# Patient Record
Sex: FEMALE | Race: WHITE | NOT HISPANIC OR LATINO
[De-identification: names, ages, dates, MRNs, and addresses within clinical notes are randomized per-mention and may not be internally consistent; named-entity substitution may affect disease eponyms.]

---

## 2018-11-27 ENCOUNTER — RECORD ABSTRACTING (OUTPATIENT)
Age: 38
End: 2018-11-27

## 2018-11-27 DIAGNOSIS — Z80.0 FAMILY HISTORY OF MALIGNANT NEOPLASM OF DIGESTIVE ORGANS: ICD-10-CM

## 2018-11-27 DIAGNOSIS — Z82.49 FAMILY HISTORY OF ISCHEMIC HEART DISEASE AND OTHER DISEASES OF THE CIRCULATORY SYSTEM: ICD-10-CM

## 2018-11-27 DIAGNOSIS — Z78.9 OTHER SPECIFIED HEALTH STATUS: ICD-10-CM

## 2018-11-27 DIAGNOSIS — Z82.3 FAMILY HISTORY OF STROKE: ICD-10-CM

## 2018-11-27 DIAGNOSIS — Z82.69 FAMILY HISTORY OF OTHER DISEASES OF THE MUSCULOSKELETAL SYSTEM AND CONNECTIVE TISSUE: ICD-10-CM

## 2018-11-27 DIAGNOSIS — Z83.3 FAMILY HISTORY OF DIABETES MELLITUS: ICD-10-CM

## 2018-11-27 LAB — CYTOLOGY CVX/VAG DOC THIN PREP: NORMAL

## 2018-12-07 ENCOUNTER — APPOINTMENT (OUTPATIENT)
Dept: OBGYN | Facility: CLINIC | Age: 38
End: 2018-12-07
Payer: COMMERCIAL

## 2018-12-07 ENCOUNTER — NON-APPOINTMENT (OUTPATIENT)
Age: 38
End: 2018-12-07

## 2018-12-07 VITALS
HEIGHT: 60 IN | SYSTOLIC BLOOD PRESSURE: 100 MMHG | DIASTOLIC BLOOD PRESSURE: 65 MMHG | BODY MASS INDEX: 26.7 KG/M2 | WEIGHT: 136 LBS

## 2018-12-07 PROCEDURE — 0502F SUBSEQUENT PRENATAL CARE: CPT

## 2019-01-04 ENCOUNTER — NON-APPOINTMENT (OUTPATIENT)
Age: 39
End: 2019-01-04

## 2019-01-04 ENCOUNTER — APPOINTMENT (OUTPATIENT)
Dept: OBGYN | Facility: CLINIC | Age: 39
End: 2019-01-04
Payer: COMMERCIAL

## 2019-01-04 VITALS
BODY MASS INDEX: 28.27 KG/M2 | SYSTOLIC BLOOD PRESSURE: 100 MMHG | DIASTOLIC BLOOD PRESSURE: 60 MMHG | HEIGHT: 60 IN | WEIGHT: 144 LBS

## 2019-01-04 PROCEDURE — 0502F SUBSEQUENT PRENATAL CARE: CPT

## 2019-01-22 ENCOUNTER — CLINICAL ADVICE (OUTPATIENT)
Age: 39
End: 2019-01-22

## 2019-01-22 ENCOUNTER — NON-APPOINTMENT (OUTPATIENT)
Age: 39
End: 2019-01-22

## 2019-01-31 ENCOUNTER — APPOINTMENT (OUTPATIENT)
Dept: OBGYN | Facility: CLINIC | Age: 39
End: 2019-01-31
Payer: COMMERCIAL

## 2019-01-31 ENCOUNTER — NON-APPOINTMENT (OUTPATIENT)
Age: 39
End: 2019-01-31

## 2019-01-31 VITALS
HEIGHT: 60 IN | SYSTOLIC BLOOD PRESSURE: 110 MMHG | BODY MASS INDEX: 29.45 KG/M2 | WEIGHT: 150 LBS | DIASTOLIC BLOOD PRESSURE: 68 MMHG

## 2019-01-31 PROCEDURE — 0502F SUBSEQUENT PRENATAL CARE: CPT

## 2019-02-04 ENCOUNTER — APPOINTMENT (OUTPATIENT)
Dept: OBGYN | Facility: CLINIC | Age: 39
End: 2019-02-04

## 2019-02-28 ENCOUNTER — APPOINTMENT (OUTPATIENT)
Dept: OBGYN | Facility: CLINIC | Age: 39
End: 2019-02-28
Payer: COMMERCIAL

## 2019-02-28 VITALS
DIASTOLIC BLOOD PRESSURE: 68 MMHG | BODY MASS INDEX: 27.29 KG/M2 | HEIGHT: 63 IN | SYSTOLIC BLOOD PRESSURE: 110 MMHG | WEIGHT: 154 LBS

## 2019-02-28 PROCEDURE — 0502F SUBSEQUENT PRENATAL CARE: CPT

## 2019-03-01 LAB
BASOPHILS # BLD AUTO: 0.04 K/UL
BASOPHILS NFR BLD AUTO: 0.3 %
EOSINOPHIL # BLD AUTO: 0.19 K/UL
EOSINOPHIL NFR BLD AUTO: 1.4 %
HCT VFR BLD CALC: 35.2 %
HGB BLD-MCNC: 11.2 G/DL
IMM GRANULOCYTES NFR BLD AUTO: 1.6 %
LYMPHOCYTES # BLD AUTO: 2.31 K/UL
LYMPHOCYTES NFR BLD AUTO: 17.2 %
MAN DIFF?: NORMAL
MCHC RBC-ENTMCNC: 30.9 PG
MCHC RBC-ENTMCNC: 31.8 GM/DL
MCV RBC AUTO: 97.2 FL
MONOCYTES # BLD AUTO: 0.73 K/UL
MONOCYTES NFR BLD AUTO: 5.4 %
NEUTROPHILS # BLD AUTO: 9.94 K/UL
NEUTROPHILS NFR BLD AUTO: 74.1 %
PLATELET # BLD AUTO: 228 K/UL
RBC # BLD: 3.62 M/UL
RBC # FLD: 13.5 %
WBC # FLD AUTO: 13.42 K/UL

## 2019-03-04 LAB
25(OH)D3 SERPL-MCNC: 50.1 NG/ML
ABO + RH PNL BLD: NORMAL
BLD GP AB SCN SERPL QL: NORMAL
GLUCOSE 1H P 50 G GLC PO SERPL-MCNC: 104 MG/DL

## 2019-03-12 ENCOUNTER — APPOINTMENT (OUTPATIENT)
Dept: OBGYN | Facility: CLINIC | Age: 39
End: 2019-03-12

## 2019-03-12 ENCOUNTER — APPOINTMENT (OUTPATIENT)
Dept: OBGYN | Facility: CLINIC | Age: 39
End: 2019-03-12
Payer: COMMERCIAL

## 2019-03-12 ENCOUNTER — NON-APPOINTMENT (OUTPATIENT)
Age: 39
End: 2019-03-12

## 2019-03-12 VITALS
DIASTOLIC BLOOD PRESSURE: 68 MMHG | HEIGHT: 63 IN | WEIGHT: 155 LBS | BODY MASS INDEX: 27.46 KG/M2 | SYSTOLIC BLOOD PRESSURE: 100 MMHG

## 2019-03-12 PROCEDURE — 0502F SUBSEQUENT PRENATAL CARE: CPT

## 2019-03-14 ENCOUNTER — APPOINTMENT (OUTPATIENT)
Dept: OBGYN | Facility: CLINIC | Age: 39
End: 2019-03-14

## 2019-03-25 ENCOUNTER — NON-APPOINTMENT (OUTPATIENT)
Age: 39
End: 2019-03-25

## 2019-03-26 ENCOUNTER — NON-APPOINTMENT (OUTPATIENT)
Age: 39
End: 2019-03-26

## 2019-03-26 ENCOUNTER — APPOINTMENT (OUTPATIENT)
Dept: OBGYN | Facility: CLINIC | Age: 39
End: 2019-03-26
Payer: COMMERCIAL

## 2019-03-26 VITALS
HEIGHT: 63 IN | SYSTOLIC BLOOD PRESSURE: 100 MMHG | DIASTOLIC BLOOD PRESSURE: 68 MMHG | WEIGHT: 160 LBS | BODY MASS INDEX: 28.35 KG/M2

## 2019-03-26 PROCEDURE — 0502F SUBSEQUENT PRENATAL CARE: CPT

## 2019-04-09 ENCOUNTER — APPOINTMENT (OUTPATIENT)
Dept: OBGYN | Facility: CLINIC | Age: 39
End: 2019-04-09
Payer: COMMERCIAL

## 2019-04-09 ENCOUNTER — NON-APPOINTMENT (OUTPATIENT)
Age: 39
End: 2019-04-09

## 2019-04-09 VITALS
WEIGHT: 160 LBS | HEIGHT: 63 IN | SYSTOLIC BLOOD PRESSURE: 100 MMHG | DIASTOLIC BLOOD PRESSURE: 68 MMHG | BODY MASS INDEX: 28.35 KG/M2

## 2019-04-09 PROCEDURE — 0502F SUBSEQUENT PRENATAL CARE: CPT

## 2019-04-23 ENCOUNTER — NON-APPOINTMENT (OUTPATIENT)
Age: 39
End: 2019-04-23

## 2019-04-23 ENCOUNTER — APPOINTMENT (OUTPATIENT)
Dept: OBGYN | Facility: CLINIC | Age: 39
End: 2019-04-23
Payer: COMMERCIAL

## 2019-04-23 VITALS
BODY MASS INDEX: 29.06 KG/M2 | HEIGHT: 63 IN | SYSTOLIC BLOOD PRESSURE: 100 MMHG | WEIGHT: 164 LBS | DIASTOLIC BLOOD PRESSURE: 70 MMHG

## 2019-04-23 PROCEDURE — 0502F SUBSEQUENT PRENATAL CARE: CPT

## 2019-05-02 ENCOUNTER — NON-APPOINTMENT (OUTPATIENT)
Age: 39
End: 2019-05-02

## 2019-05-02 ENCOUNTER — APPOINTMENT (OUTPATIENT)
Dept: OBGYN | Facility: CLINIC | Age: 39
End: 2019-05-02
Payer: COMMERCIAL

## 2019-05-02 VITALS
DIASTOLIC BLOOD PRESSURE: 58 MMHG | BODY MASS INDEX: 29.77 KG/M2 | SYSTOLIC BLOOD PRESSURE: 100 MMHG | HEIGHT: 63 IN | WEIGHT: 168 LBS

## 2019-05-02 DIAGNOSIS — Z11.3 ENCOUNTER FOR SCREENING FOR INFECTIONS WITH A PREDOMINANTLY SEXUAL MODE OF TRANSMISSION: ICD-10-CM

## 2019-05-02 PROCEDURE — 36415 COLL VENOUS BLD VENIPUNCTURE: CPT

## 2019-05-02 PROCEDURE — 0502F SUBSEQUENT PRENATAL CARE: CPT

## 2019-05-03 LAB
ALBUMIN SERPL ELPH-MCNC: 3.8 G/DL
ALP BLD-CCNC: 152 U/L
ALT SERPL-CCNC: 22 U/L
ANION GAP SERPL CALC-SCNC: 17 MMOL/L
AST SERPL-CCNC: 31 U/L
BILIRUB SERPL-MCNC: 0.3 MG/DL
BUN SERPL-MCNC: 9 MG/DL
C TRACH RRNA SPEC QL NAA+PROBE: NOT DETECTED
CALCIUM SERPL-MCNC: 9.9 MG/DL
CHLORIDE SERPL-SCNC: 99 MMOL/L
CO2 SERPL-SCNC: 22 MMOL/L
CREAT SERPL-MCNC: 0.54 MG/DL
GLUCOSE SERPL-MCNC: 67 MG/DL
HIV1+2 AB SPEC QL IA.RAPID: NONREACTIVE
LDH SERPL-CCNC: 261 U/L
N GONORRHOEA RRNA SPEC QL NAA+PROBE: NOT DETECTED
POTASSIUM SERPL-SCNC: 4 MMOL/L
PROT SERPL-MCNC: 6.4 G/DL
SODIUM SERPL-SCNC: 138 MMOL/L
SOURCE AMPLIFICATION: NORMAL

## 2019-05-06 ENCOUNTER — NON-APPOINTMENT (OUTPATIENT)
Age: 39
End: 2019-05-06

## 2019-05-06 LAB
GP B STREP DNA SPEC QL NAA+PROBE: NORMAL
GP B STREP DNA SPEC QL NAA+PROBE: NOT DETECTED
MEV IGG FLD QL IA: <5 AU/ML
MEV IGG+IGM SER-IMP: NEGATIVE
MUV AB SER-ACNC: NEGATIVE
MUV IGG SER QL IA: 6.1 AU/ML
SOURCE GBS: NORMAL
T PALLIDUM AB SER QL IA: NEGATIVE

## 2019-05-08 ENCOUNTER — NON-APPOINTMENT (OUTPATIENT)
Age: 39
End: 2019-05-08

## 2019-05-10 ENCOUNTER — NON-APPOINTMENT (OUTPATIENT)
Age: 39
End: 2019-05-10

## 2019-05-10 ENCOUNTER — APPOINTMENT (OUTPATIENT)
Dept: OBGYN | Facility: CLINIC | Age: 39
End: 2019-05-10
Payer: COMMERCIAL

## 2019-05-10 VITALS
WEIGHT: 165 LBS | BODY MASS INDEX: 29.23 KG/M2 | HEIGHT: 63 IN | DIASTOLIC BLOOD PRESSURE: 70 MMHG | SYSTOLIC BLOOD PRESSURE: 115 MMHG

## 2019-05-10 PROCEDURE — 0502F SUBSEQUENT PRENATAL CARE: CPT

## 2019-05-12 ENCOUNTER — NON-APPOINTMENT (OUTPATIENT)
Age: 39
End: 2019-05-12

## 2019-05-17 ENCOUNTER — NON-APPOINTMENT (OUTPATIENT)
Age: 39
End: 2019-05-17

## 2019-05-17 ENCOUNTER — APPOINTMENT (OUTPATIENT)
Dept: OBGYN | Facility: CLINIC | Age: 39
End: 2019-05-17
Payer: COMMERCIAL

## 2019-05-17 VITALS
HEIGHT: 63 IN | DIASTOLIC BLOOD PRESSURE: 70 MMHG | BODY MASS INDEX: 29.23 KG/M2 | SYSTOLIC BLOOD PRESSURE: 108 MMHG | WEIGHT: 165 LBS

## 2019-05-17 PROCEDURE — 0502F SUBSEQUENT PRENATAL CARE: CPT

## 2019-05-19 ENCOUNTER — NON-APPOINTMENT (OUTPATIENT)
Age: 39
End: 2019-05-19

## 2019-05-23 ENCOUNTER — APPOINTMENT (OUTPATIENT)
Dept: OBGYN | Facility: CLINIC | Age: 39
End: 2019-05-23
Payer: COMMERCIAL

## 2019-05-23 VITALS
BODY MASS INDEX: 30.3 KG/M2 | WEIGHT: 171 LBS | HEIGHT: 63 IN | SYSTOLIC BLOOD PRESSURE: 100 MMHG | DIASTOLIC BLOOD PRESSURE: 70 MMHG

## 2019-05-23 PROCEDURE — 59426 ANTEPARTUM CARE ONLY: CPT

## 2019-06-01 ENCOUNTER — MOBILE ON CALL (OUTPATIENT)
Age: 39
End: 2019-06-01

## 2019-06-13 ENCOUNTER — APPOINTMENT (OUTPATIENT)
Dept: OBGYN | Facility: CLINIC | Age: 39
End: 2019-06-13
Payer: COMMERCIAL

## 2019-06-13 DIAGNOSIS — Z30.09 ENCOUNTER FOR OTHER GENERAL COUNSELING AND ADVICE ON CONTRACEPTION: ICD-10-CM

## 2019-06-13 PROCEDURE — 99213 OFFICE O/P EST LOW 20 MIN: CPT

## 2019-06-13 NOTE — HISTORY OF PRESENT ILLNESS
[Postpartum Follow Up] : postpartum follow up [Complications:___] : no complications [BF with Difficulty] : nursing with difficulty [Delivery Date: ___] : on [unfilled] [Breastfeeding] : currently nursing [None] : The patient is currently asymptomatic [Currently Experiencing] : The patient is currently experiencing symptoms. [Breast Pain] : no breast pain [BreastFeeding Problems] : breastfeeding problems [S/Sx PP Depression] : no signs/symptoms of postpartum depression [de-identified] : Hardy [de-identified] : ws seen in [facility] for lactatioan issues and weight loss/biirubin. was started pumping and formula supplement Has ocntinued pumping 15 mins per day stopped formula yesterday.  feedings taking 2-2.5hrs. denies breast pain [de-identified] : no PE

## 2019-07-08 ENCOUNTER — APPOINTMENT (OUTPATIENT)
Dept: OBGYN | Facility: CLINIC | Age: 39
End: 2019-07-08
Payer: COMMERCIAL

## 2019-07-08 PROCEDURE — 0503F POSTPARTUM CARE VISIT: CPT

## 2019-07-08 NOTE — HISTORY OF PRESENT ILLNESS
[Postpartum Follow Up] : postpartum follow up [Complications:___] : no complications [Primary C/S] : delivered by  section [Delivery Date: ___] : on [unfilled] [Male] : Delivery History: baby boy [Wt. ___] : weighing [unfilled] [Breastfeeding] : currently nursing [Back to Normal] : is back to normal in size [Examination Of The Breasts] : breasts are normal [Doing Well] : is doing well [Normal] : the vagina was normal [Excellent Pain Control] : has excellent pain control [No Sign of Infection] : is showing no signs of infection [None] : None [de-identified] : Normal postpartum, adjusting well [de-identified] : Doing well, getting lactation support from Britt Moctezuma. Mother in law tension. Needs more time alone. [de-identified] : 2fb umb diastasis [de-identified] : Cleared for exercise/ sex. Every Mother deonna given.\par Birth control discussed and  abstinence for now      chosen.\par Continue PNV\par Mothers' groups encouraged\par RTO 6mos

## 2019-09-19 ENCOUNTER — APPOINTMENT (OUTPATIENT)
Dept: OBGYN | Facility: CLINIC | Age: 39
End: 2019-09-19
Payer: COMMERCIAL

## 2019-09-19 VITALS — SYSTOLIC BLOOD PRESSURE: 100 MMHG | HEIGHT: 63 IN | DIASTOLIC BLOOD PRESSURE: 58 MMHG

## 2019-09-19 DIAGNOSIS — N89.8 OTHER SPECIFIED NONINFLAMMATORY DISORDERS OF VAGINA: ICD-10-CM

## 2019-09-19 PROCEDURE — 99213 OFFICE O/P EST LOW 20 MIN: CPT

## 2019-09-19 NOTE — PHYSICAL EXAM
[Normal] : uterus [Atrophy] : atrophy [No Bleeding] : there was no active vaginal bleeding [Uterine Adnexae] : were not tender and not enlarged

## 2019-09-20 LAB
CANDIDA VAG CYTO: NOT DETECTED
G VAGINALIS+PREV SP MTYP VAG QL MICRO: NOT DETECTED
T VAGINALIS VAG QL WET PREP: NOT DETECTED

## 2020-01-14 ENCOUNTER — TRANSCRIPTION ENCOUNTER (OUTPATIENT)
Age: 40
End: 2020-01-14

## 2020-01-15 ENCOUNTER — APPOINTMENT (OUTPATIENT)
Dept: OBGYN | Facility: CLINIC | Age: 40
End: 2020-01-15
Payer: COMMERCIAL

## 2020-01-15 VITALS
DIASTOLIC BLOOD PRESSURE: 68 MMHG | SYSTOLIC BLOOD PRESSURE: 104 MMHG | HEIGHT: 63 IN | WEIGHT: 135 LBS | BODY MASS INDEX: 23.92 KG/M2

## 2020-01-15 PROBLEM — N89.8 VAGINAL IRRITATION: Status: ACTIVE | Noted: 2019-09-19

## 2020-01-15 PROCEDURE — 99396 PREV VISIT EST AGE 40-64: CPT

## 2020-01-15 PROCEDURE — 36415 COLL VENOUS BLD VENIPUNCTURE: CPT

## 2020-01-21 LAB
25(OH)D3 SERPL-MCNC: 79.8 NG/ML
ALBUMIN SERPL ELPH-MCNC: 4.6 G/DL
ALP BLD-CCNC: 72 U/L
ALT SERPL-CCNC: 15 U/L
ANION GAP SERPL CALC-SCNC: 16 MMOL/L
AST SERPL-CCNC: 27 U/L
BASOPHILS # BLD AUTO: 0.07 K/UL
BASOPHILS NFR BLD AUTO: 0.8 %
BILIRUB SERPL-MCNC: 0.5 MG/DL
BUN SERPL-MCNC: 17 MG/DL
CALCIUM SERPL-MCNC: 9.5 MG/DL
CHLORIDE SERPL-SCNC: 103 MMOL/L
CHOLEST SERPL-MCNC: 171 MG/DL
CHOLEST/HDLC SERPL: 2.3 RATIO
CO2 SERPL-SCNC: 23 MMOL/L
CREAT SERPL-MCNC: 0.78 MG/DL
CYTOLOGY CVX/VAG DOC THIN PREP: NORMAL
EOSINOPHIL # BLD AUTO: 0.16 K/UL
EOSINOPHIL NFR BLD AUTO: 1.8 %
GLUCOSE SERPL-MCNC: 83 MG/DL
HCT VFR BLD CALC: 41.5 %
HDLC SERPL-MCNC: 75 MG/DL
HGB BLD-MCNC: 13.1 G/DL
HPV HIGH+LOW RISK DNA PNL CVX: NOT DETECTED
IMM GRANULOCYTES NFR BLD AUTO: 0.2 %
LDLC SERPL CALC-MCNC: 80 MG/DL
LYMPHOCYTES # BLD AUTO: 3.04 K/UL
LYMPHOCYTES NFR BLD AUTO: 34.6 %
MAN DIFF?: NORMAL
MCHC RBC-ENTMCNC: 29.8 PG
MCHC RBC-ENTMCNC: 31.6 GM/DL
MCV RBC AUTO: 94.3 FL
MONOCYTES # BLD AUTO: 0.45 K/UL
MONOCYTES NFR BLD AUTO: 5.1 %
NEUTROPHILS # BLD AUTO: 5.04 K/UL
NEUTROPHILS NFR BLD AUTO: 57.5 %
PLATELET # BLD AUTO: 276 K/UL
POTASSIUM SERPL-SCNC: 4.1 MMOL/L
PROT SERPL-MCNC: 6.9 G/DL
RBC # BLD: 4.4 M/UL
RBC # FLD: 13.3 %
SODIUM SERPL-SCNC: 142 MMOL/L
T4 FREE SERPL-MCNC: 1.3 NG/DL
TRIGL SERPL-MCNC: 78 MG/DL
TSH SERPL-ACNC: 2.19 UIU/ML
WBC # FLD AUTO: 8.78 K/UL

## 2020-05-29 ENCOUNTER — APPOINTMENT (OUTPATIENT)
Dept: OBGYN | Facility: CLINIC | Age: 40
End: 2020-05-29
Payer: COMMERCIAL

## 2020-05-29 VITALS
BODY MASS INDEX: 23.21 KG/M2 | SYSTOLIC BLOOD PRESSURE: 104 MMHG | WEIGHT: 131 LBS | HEIGHT: 63 IN | TEMPERATURE: 98.2 F | DIASTOLIC BLOOD PRESSURE: 62 MMHG

## 2020-05-29 DIAGNOSIS — N81.4 UTEROVAGINAL PROLAPSE, UNSPECIFIED: ICD-10-CM

## 2020-05-29 DIAGNOSIS — N94.9 UNSPECIFIED CONDITION ASSOCIATED WITH FEMALE GENITAL ORGANS AND MENSTRUAL CYCLE: ICD-10-CM

## 2020-05-29 PROCEDURE — 99213 OFFICE O/P EST LOW 20 MIN: CPT

## 2020-05-29 NOTE — PHYSICAL EXAM
[Normal] : uterus [No Bleeding] : there was no active vaginal bleeding [Uterine Adnexae] : were not tender and not enlarged [Adnexa Tenderness] : were not tender [FreeTextEntry7] : Small bladder prolapse; strong kegels [Ovarian Mass (___ Cm)] : there were no adnexal masses

## 2021-02-14 ENCOUNTER — TRANSCRIPTION ENCOUNTER (OUTPATIENT)
Age: 41
End: 2021-02-14

## 2021-03-04 DIAGNOSIS — Z34.93 ENCOUNTER FOR SUPERVISION OF NORMAL PREGNANCY, UNSPECIFIED, THIRD TRIMESTER: ICD-10-CM

## 2021-05-03 ENCOUNTER — TRANSCRIPTION ENCOUNTER (OUTPATIENT)
Age: 41
End: 2021-05-03

## 2021-05-18 ENCOUNTER — APPOINTMENT (OUTPATIENT)
Dept: OBGYN | Facility: CLINIC | Age: 41
End: 2021-05-18
Payer: COMMERCIAL

## 2021-05-18 ENCOUNTER — NON-APPOINTMENT (OUTPATIENT)
Age: 41
End: 2021-05-18

## 2021-05-18 VITALS
DIASTOLIC BLOOD PRESSURE: 64 MMHG | BODY MASS INDEX: 21.62 KG/M2 | SYSTOLIC BLOOD PRESSURE: 112 MMHG | WEIGHT: 122 LBS | HEIGHT: 63 IN

## 2021-05-18 DIAGNOSIS — Z67.21 TYPE B BLOOD, RH NEGATIVE: ICD-10-CM

## 2021-05-18 DIAGNOSIS — D25.9 LEIOMYOMA OF UTERUS, UNSPECIFIED: ICD-10-CM

## 2021-05-18 DIAGNOSIS — Z34.02 ENCOUNTER FOR SUPERVISION OF NORMAL FIRST PREGNANCY, SECOND TRIMESTER: ICD-10-CM

## 2021-05-18 DIAGNOSIS — O09.512 SUPERVISION OF ELDERLY PRIMIGRAVIDA, SECOND TRIMESTER: ICD-10-CM

## 2021-05-18 DIAGNOSIS — Z11.59 ENCOUNTER FOR SCREENING FOR OTHER VIRAL DISEASES: ICD-10-CM

## 2021-05-18 DIAGNOSIS — O12.03 GESTATIONAL EDEMA, THIRD TRIMESTER: ICD-10-CM

## 2021-05-18 LAB
BILIRUB UR QL STRIP: NORMAL
CLARITY UR: CLEAR
COLLECTION METHOD: NORMAL
GLUCOSE UR-MCNC: NORMAL
HGB UR QL STRIP.AUTO: NORMAL
KETONES UR-MCNC: NORMAL
LEUKOCYTE ESTERASE UR QL STRIP: NORMAL
NITRITE UR QL STRIP: NORMAL
PROT UR STRIP-MCNC: NORMAL

## 2021-05-18 PROCEDURE — 99396 PREV VISIT EST AGE 40-64: CPT

## 2021-05-18 PROCEDURE — 81003 URINALYSIS AUTO W/O SCOPE: CPT | Mod: QW

## 2021-05-18 PROCEDURE — 99072 ADDL SUPL MATRL&STAF TM PHE: CPT

## 2021-05-18 RX ORDER — MULTIVIT-MIN/FOLIC/VIT K/LYCOP 400-300MCG
28-0.8 TABLET ORAL DAILY
Refills: 0 | Status: DISCONTINUED | COMMUNITY
End: 2021-05-18

## 2021-05-18 RX ORDER — HUMAN RHO(D) IMMUNE GLOBULIN 300 UG/1
1500 INJECTION, SOLUTION INTRAMUSCULAR
Qty: 1 | Refills: 0 | Status: DISCONTINUED | COMMUNITY
Start: 2019-02-28 | End: 2021-05-18

## 2021-05-21 LAB
HPV 16 E6+E7 MRNA CVX QL NAA+PROBE: NOT DETECTED
HPV18+45 E6+E7 MRNA CVX QL NAA+PROBE: NOT DETECTED

## 2021-05-22 ENCOUNTER — TRANSCRIPTION ENCOUNTER (OUTPATIENT)
Age: 41
End: 2021-05-22

## 2021-05-24 ENCOUNTER — TRANSCRIPTION ENCOUNTER (OUTPATIENT)
Age: 41
End: 2021-05-24

## 2021-05-24 LAB — CYTOLOGY CVX/VAG DOC THIN PREP: NORMAL

## 2021-06-04 ENCOUNTER — TRANSCRIPTION ENCOUNTER (OUTPATIENT)
Age: 41
End: 2021-06-04

## 2021-06-07 ENCOUNTER — TRANSCRIPTION ENCOUNTER (OUTPATIENT)
Age: 41
End: 2021-06-07

## 2021-07-08 ENCOUNTER — APPOINTMENT (OUTPATIENT)
Dept: SURGERY | Facility: CLINIC | Age: 41
End: 2021-07-08
Payer: COMMERCIAL

## 2021-07-08 VITALS
DIASTOLIC BLOOD PRESSURE: 77 MMHG | WEIGHT: 123 LBS | TEMPERATURE: 97.8 F | HEART RATE: 74 BPM | HEIGHT: 60 IN | BODY MASS INDEX: 24.15 KG/M2 | SYSTOLIC BLOOD PRESSURE: 136 MMHG

## 2021-07-08 PROCEDURE — 99203 OFFICE O/P NEW LOW 30 MIN: CPT

## 2021-07-08 PROCEDURE — 99072 ADDL SUPL MATRL&STAF TM PHE: CPT

## 2021-07-08 NOTE — PHYSICAL EXAM
[de-identified] : NAD [de-identified] : soft, small umbilical hernia non-tender, right sided abdominal pain at the ASIS extending to lateral edge of  scar, no rebound/guarding

## 2021-07-08 NOTE — CONSULT LETTER
[Dear  ___] : Dear  [unfilled], [Courtesy Letter:] : I had the pleasure of seeing your patient, [unfilled], in my office today. [Please see my note below.] : Please see my note below. [Consult Closing:] : Thank you very much for allowing me to participate in the care of this patient.  If you have any questions, please do not hesitate to contact me. [Sincerely,] : Sincerely, [FreeTextEntry3] : Geneva Whitaker

## 2021-07-08 NOTE — HISTORY OF PRESENT ILLNESS
[de-identified] : Pleasant 40 yo F presents for evaluation of right sided abdominal pain.   [de-identified] : Patient states she has had a dull groin pain at the right side of her  scar since the surgery 2 years ago.  She feels that she strains her right side to lift her son and perform daily activities which exacerbated the pain.  Over the past few days the pain intensified prompting her to Visit the ED at which time a CT A/P was performed.  The CT showed There is a small periumbilical fat-containing hernia however no inguinal hernia. Normal appendix. No findings to suggest etiology of abdominal pain.\par She does note that she has been doing more exercise recently and feels that the pain may be due to a strained muscle.  The pain does not improve with tylenol.  She otherwise feels well and is tolerating a regular diet with normal bowel function.  She denies N/V, fevers/chills

## 2021-07-09 ENCOUNTER — TRANSCRIPTION ENCOUNTER (OUTPATIENT)
Age: 41
End: 2021-07-09

## 2021-07-15 ENCOUNTER — APPOINTMENT (OUTPATIENT)
Dept: OBGYN | Facility: CLINIC | Age: 41
End: 2021-07-15
Payer: COMMERCIAL

## 2021-07-15 VITALS
DIASTOLIC BLOOD PRESSURE: 78 MMHG | BODY MASS INDEX: 23.95 KG/M2 | HEIGHT: 60 IN | SYSTOLIC BLOOD PRESSURE: 114 MMHG | WEIGHT: 122 LBS

## 2021-07-15 DIAGNOSIS — Z00.00 ENCOUNTER FOR GENERAL ADULT MEDICAL EXAMINATION W/OUT ABNORMAL FINDINGS: ICD-10-CM

## 2021-07-15 DIAGNOSIS — M79.18 MYALGIA, OTHER SITE: ICD-10-CM

## 2021-07-15 DIAGNOSIS — N92.6 IRREGULAR MENSTRUATION, UNSPECIFIED: ICD-10-CM

## 2021-07-15 PROCEDURE — 99213 OFFICE O/P EST LOW 20 MIN: CPT

## 2021-07-15 PROCEDURE — 99072 ADDL SUPL MATRL&STAF TM PHE: CPT

## 2021-07-17 LAB — ESTRADIOL SERPL-MCNC: 268 PG/ML

## 2021-07-19 ENCOUNTER — TRANSCRIPTION ENCOUNTER (OUTPATIENT)
Age: 41
End: 2021-07-19

## 2021-07-19 LAB
FSH SERPL-MCNC: 13.8 IU/L
LH SERPL-ACNC: 23.7 IU/L

## 2021-07-22 ENCOUNTER — TRANSCRIPTION ENCOUNTER (OUTPATIENT)
Age: 41
End: 2021-07-22

## 2021-09-08 ENCOUNTER — TRANSCRIPTION ENCOUNTER (OUTPATIENT)
Age: 41
End: 2021-09-08

## 2022-01-31 ENCOUNTER — NON-APPOINTMENT (OUTPATIENT)
Age: 42
End: 2022-01-31

## 2022-01-31 ENCOUNTER — APPOINTMENT (OUTPATIENT)
Dept: HEART AND VASCULAR | Facility: CLINIC | Age: 42
End: 2022-01-31
Payer: COMMERCIAL

## 2022-01-31 VITALS
DIASTOLIC BLOOD PRESSURE: 62 MMHG | TEMPERATURE: 97.1 F | OXYGEN SATURATION: 100 % | HEART RATE: 70 BPM | WEIGHT: 123 LBS | RESPIRATION RATE: 16 BRPM | SYSTOLIC BLOOD PRESSURE: 100 MMHG | HEIGHT: 60 IN | BODY MASS INDEX: 24.15 KG/M2

## 2022-01-31 DIAGNOSIS — M24.9 JOINT DERANGEMENT, UNSPECIFIED: ICD-10-CM

## 2022-01-31 DIAGNOSIS — Z86.79 PERSONAL HISTORY OF OTHER DISEASES OF THE CIRCULATORY SYSTEM: ICD-10-CM

## 2022-01-31 DIAGNOSIS — Z87.19 PERSONAL HISTORY OF OTHER DISEASES OF THE DIGESTIVE SYSTEM: ICD-10-CM

## 2022-01-31 DIAGNOSIS — Z87.09 PERSONAL HISTORY OF OTHER DISEASES OF THE RESPIRATORY SYSTEM: ICD-10-CM

## 2022-01-31 DIAGNOSIS — R94.31 ABNORMAL ELECTROCARDIOGRAM [ECG] [EKG]: ICD-10-CM

## 2022-01-31 DIAGNOSIS — Z87.820 PERSONAL HISTORY OF TRAUMATIC BRAIN INJURY: ICD-10-CM

## 2022-01-31 DIAGNOSIS — R07.89 OTHER CHEST PAIN: ICD-10-CM

## 2022-01-31 DIAGNOSIS — R55 SYNCOPE AND COLLAPSE: ICD-10-CM

## 2022-01-31 DIAGNOSIS — J45.990 EXERCISE INDUCED BRONCHOSPASM: ICD-10-CM

## 2022-01-31 DIAGNOSIS — Z86.69 PERSONAL HISTORY OF OTHER DISEASES OF THE NERVOUS SYSTEM AND SENSE ORGANS: ICD-10-CM

## 2022-01-31 DIAGNOSIS — R00.2 PALPITATIONS: ICD-10-CM

## 2022-01-31 DIAGNOSIS — M35.7 HYPERMOBILITY SYNDROME: ICD-10-CM

## 2022-01-31 PROCEDURE — 99205 OFFICE O/P NEW HI 60 MIN: CPT

## 2022-01-31 PROCEDURE — 93248 EXT ECG>7D<15D REV&INTERPJ: CPT

## 2022-01-31 PROCEDURE — 93246 EXT ECG>7D<15D RECORDING: CPT

## 2022-01-31 PROCEDURE — 93000 ELECTROCARDIOGRAM COMPLETE: CPT

## 2022-01-31 RX ORDER — ACETAMINOPHEN 325 MG
TABLET ORAL
Refills: 0 | Status: ACTIVE | COMMUNITY

## 2022-01-31 RX ORDER — BACILLUS COAGULANS/INULIN 1B-250 MG
CAPSULE ORAL
Refills: 0 | Status: ACTIVE | COMMUNITY

## 2022-01-31 RX ORDER — MULTIVITAMIN
TABLET ORAL
Refills: 0 | Status: ACTIVE | COMMUNITY

## 2022-02-01 ENCOUNTER — NON-APPOINTMENT (OUTPATIENT)
Age: 42
End: 2022-02-01

## 2022-02-01 PROBLEM — R00.2 PALPITATIONS: Status: ACTIVE | Noted: 2022-01-31

## 2022-02-01 PROBLEM — R94.31 ABNORMAL ELECTROCARDIOGRAM: Status: ACTIVE | Noted: 2022-02-01

## 2022-02-01 PROBLEM — J45.990 EXERCISE-INDUCED ASTHMA: Status: RESOLVED | Noted: 2022-01-31 | Resolved: 2022-02-01

## 2022-02-01 PROBLEM — R55 PRE-SYNCOPE: Status: ACTIVE | Noted: 2022-02-01

## 2022-02-01 PROBLEM — Z87.19 HISTORY OF GASTROESOPHAGEAL REFLUX (GERD): Status: RESOLVED | Noted: 2022-01-31 | Resolved: 2022-02-01

## 2022-02-01 PROBLEM — R07.89 CHEST TIGHTNESS: Status: ACTIVE | Noted: 2022-02-01

## 2022-02-01 PROBLEM — Z87.09 HISTORY OF PARANASAL SINUS CONGESTION: Status: RESOLVED | Noted: 2022-01-31 | Resolved: 2022-02-01

## 2022-02-01 PROBLEM — Z86.69 HISTORY OF MIGRAINE HEADACHES: Status: RESOLVED | Noted: 2022-01-31 | Resolved: 2022-02-01

## 2022-02-01 PROBLEM — Z87.820 HISTORY OF CONCUSSION: Status: RESOLVED | Noted: 2022-01-31 | Resolved: 2022-02-01

## 2022-02-01 NOTE — DISCUSSION/SUMMARY
[ECG Normal Variant] : ECG normal variant [Myocardial Ischemia] : myocardial ischemia [Possible Cardiac Ischemia (Intermd Prob)] : possible cardiac ischemia (intermediate probability) [Non-Cardiac] : non-cardiac chest pain [Rhythm Disorder] : rhythm disorder [Deteriorating] : deteriorating [Low Sodium Diet] : low sodium diet [Stable] : stable [None] : There are no changes in medication management [Patient] : the patient [de-identified] : MONTES [de-identified] : pre-syncope - menses related

## 2022-02-01 NOTE — REASON FOR VISIT
[Symptom and Test Evaluation] : symptom and test evaluation [Arrhythmia/ECG Abnorrmalities] : arrhythmia/ECG abnormalities [FreeTextEntry3] : Stevan Moon

## 2022-02-01 NOTE — HISTORY OF PRESENT ILLNESS
[FreeTextEntry1] : Shante Hernandez is a 42 yo female who presents for CV evaluation.\par \par Since the Summer of 2021, she has experienced intermittent palps (swooshing sensation) at night when she lies down with her son, lasting 2 minutes, gradual onset and resolution.  Symptoms have increased in frequency - but notes that the symptoms have been better over the last one month or so.  The palps are different from her "typical" palps.  She has chest tightness, especially in the cold weather.  She has MONTES with stairs.  She denies pnd, orthopnea, or edema.  \par \par She has a long hx of pre-syncopal episodes, typically just before her menses.  Symptoms were less frequent in the last two years.  Her son is 1 yo. \par \par Over 20 years ago, she did undergo eval for joint stiffness and inflammation.  She recalls being told she may may Jayda Danlos.  TTE was reported done and she states showed "leaky" valves.\par \par She is active but not exercising.  She is compliant with supplements.\par \par ECG today reveals sinus bradycardia, low voltage, ST T changes, consider ischemia.\par \par Clinical hx reviewed in detail.\par \par Recommendations:\par 1. inc po water intake\par 2. exercise\par 3. telemetry\par 4. EXSE\par 5. CPET\par 6. carotid, LE arterial, aorta duplex\par 7. t/c TTT\par \par

## 2022-02-11 ENCOUNTER — APPOINTMENT (OUTPATIENT)
Dept: HEART AND VASCULAR | Facility: CLINIC | Age: 42
End: 2022-02-11
Payer: COMMERCIAL

## 2022-02-11 PROCEDURE — 93978 VASCULAR STUDY: CPT

## 2022-02-11 PROCEDURE — 93880 EXTRACRANIAL BILAT STUDY: CPT

## 2022-02-16 ENCOUNTER — APPOINTMENT (OUTPATIENT)
Dept: HEART AND VASCULAR | Facility: CLINIC | Age: 42
End: 2022-02-16
Payer: COMMERCIAL

## 2022-02-16 VITALS
OXYGEN SATURATION: 97 % | BODY MASS INDEX: 24.15 KG/M2 | HEIGHT: 60 IN | SYSTOLIC BLOOD PRESSURE: 100 MMHG | WEIGHT: 123 LBS | HEART RATE: 69 BPM | DIASTOLIC BLOOD PRESSURE: 70 MMHG

## 2022-02-16 PROCEDURE — 93925 LOWER EXTREMITY STUDY: CPT

## 2022-02-16 PROCEDURE — 93320 DOPPLER ECHO COMPLETE: CPT

## 2022-02-16 PROCEDURE — 93351 STRESS TTE COMPLETE: CPT

## 2022-02-16 PROCEDURE — 93325 DOPPLER ECHO COLOR FLOW MAPG: CPT

## 2022-02-18 ENCOUNTER — NON-APPOINTMENT (OUTPATIENT)
Age: 42
End: 2022-02-18

## 2022-02-23 ENCOUNTER — NON-APPOINTMENT (OUTPATIENT)
Age: 42
End: 2022-02-23

## 2022-02-25 ENCOUNTER — NON-APPOINTMENT (OUTPATIENT)
Age: 42
End: 2022-02-25

## 2022-03-15 ENCOUNTER — APPOINTMENT (OUTPATIENT)
Dept: HEART AND VASCULAR | Facility: CLINIC | Age: 42
End: 2022-03-15

## 2022-04-11 PROBLEM — Z11.59 SCREENING FOR VIRAL DISEASE: Status: RESOLVED | Noted: 2019-05-02 | Resolved: 2021-05-18

## 2022-04-25 ENCOUNTER — APPOINTMENT (OUTPATIENT)
Dept: HEART AND VASCULAR | Facility: CLINIC | Age: 42
End: 2022-04-25

## 2022-05-09 ENCOUNTER — NON-APPOINTMENT (OUTPATIENT)
Age: 42
End: 2022-05-09

## 2022-06-27 ENCOUNTER — NON-APPOINTMENT (OUTPATIENT)
Age: 42
End: 2022-06-27

## 2022-06-27 ENCOUNTER — APPOINTMENT (OUTPATIENT)
Dept: OBGYN | Facility: CLINIC | Age: 42
End: 2022-06-27
Payer: COMMERCIAL

## 2022-06-27 VITALS
BODY MASS INDEX: 24.35 KG/M2 | DIASTOLIC BLOOD PRESSURE: 64 MMHG | HEIGHT: 60 IN | SYSTOLIC BLOOD PRESSURE: 112 MMHG | WEIGHT: 124 LBS

## 2022-06-27 DIAGNOSIS — Z13.29 ENCOUNTER FOR SCREENING FOR OTHER SUSPECTED ENDOCRINE DISORDER: ICD-10-CM

## 2022-06-27 DIAGNOSIS — D25.9 LEIOMYOMA OF UTERUS, UNSPECIFIED: ICD-10-CM

## 2022-06-27 DIAGNOSIS — E55.9 VITAMIN D DEFICIENCY, UNSPECIFIED: ICD-10-CM

## 2022-06-27 PROCEDURE — 36415 COLL VENOUS BLD VENIPUNCTURE: CPT

## 2022-06-27 PROCEDURE — 99396 PREV VISIT EST AGE 40-64: CPT

## 2022-06-27 NOTE — DISCUSSION/SUMMARY
[FreeTextEntry1] : Pap/HPV done per pt request annually\par Mammogram/ breast u/s Rx given\par Breast self exam reinforced\par Annual labs: CBC, CMET, Vit D, Thyroid panel, hgba1c\par Referral to pelvic floor PT given\par Diet and exercise reviewed\par Stress mgmt strategies reviewed\par Supplements: multivitamin, fish oil, Vit D\par RTO 1yr or prn\par

## 2022-06-27 NOTE — PHYSICAL EXAM
[Appropriately responsive] : appropriately responsive [Alert] : alert [No Acute Distress] : no acute distress [No Lymphadenopathy] : no lymphadenopathy [Regular Rate Rhythm] : regular rate rhythm [No Murmurs] : no murmurs [Clear to Auscultation B/L] : clear to auscultation bilaterally [Soft] : soft [Non-tender] : non-tender [Non-distended] : non-distended [No HSM] : No HSM [No Lesions] : no lesions [No Mass] : no mass [Oriented x3] : oriented x3 [Examination Of The Breasts] : a normal appearance [No Masses] : no breast masses were palpable [Labia Majora] : normal [Labia Minora] : normal [Normal] : normal [Uterine Adnexae] : normal [FreeTextEntry4] : great kegels [FreeTextEntry5] : mild bleeding
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
## 2022-07-01 LAB
25(OH)D3 SERPL-MCNC: 35.4 NG/ML
ALBUMIN SERPL ELPH-MCNC: 5.2 G/DL
ALP BLD-CCNC: 59 U/L
ALT SERPL-CCNC: 9 U/L
ANION GAP SERPL CALC-SCNC: 16 MMOL/L
AST SERPL-CCNC: 31 U/L
BASOPHILS # BLD AUTO: 0.06 K/UL
BASOPHILS NFR BLD AUTO: 0.7 %
BILIRUB SERPL-MCNC: 0.6 MG/DL
BUN SERPL-MCNC: 20 MG/DL
C TRACH RRNA SPEC QL NAA+PROBE: NOT DETECTED
CALCIUM SERPL-MCNC: 9.6 MG/DL
CHLORIDE SERPL-SCNC: 100 MMOL/L
CHOLEST SERPL-MCNC: 217 MG/DL
CO2 SERPL-SCNC: 23 MMOL/L
CREAT SERPL-MCNC: 0.82 MG/DL
CYTOLOGY CVX/VAG DOC THIN PREP: NORMAL
EGFR: 90 ML/MIN/1.73M2
EOSINOPHIL # BLD AUTO: 0.14 K/UL
EOSINOPHIL NFR BLD AUTO: 1.7 %
GLUCOSE SERPL-MCNC: 78 MG/DL
HCT VFR BLD CALC: 40.3 %
HDLC SERPL-MCNC: 87 MG/DL
HGB BLD-MCNC: 12.8 G/DL
HPV HIGH+LOW RISK DNA PNL CVX: NOT DETECTED
IMM GRANULOCYTES NFR BLD AUTO: 0.2 %
LDLC SERPL CALC-MCNC: 116 MG/DL
LYMPHOCYTES # BLD AUTO: 2.44 K/UL
LYMPHOCYTES NFR BLD AUTO: 29.9 %
MAN DIFF?: NORMAL
MCHC RBC-ENTMCNC: 30.5 PG
MCHC RBC-ENTMCNC: 31.8 GM/DL
MCV RBC AUTO: 96.2 FL
MONOCYTES # BLD AUTO: 0.37 K/UL
MONOCYTES NFR BLD AUTO: 4.5 %
N GONORRHOEA RRNA SPEC QL NAA+PROBE: NOT DETECTED
NEUTROPHILS # BLD AUTO: 5.12 K/UL
NEUTROPHILS NFR BLD AUTO: 63 %
NONHDLC SERPL-MCNC: 130 MG/DL
PLATELET # BLD AUTO: 270 K/UL
POTASSIUM SERPL-SCNC: 4.3 MMOL/L
PROT SERPL-MCNC: 7.3 G/DL
RBC # BLD: 4.19 M/UL
RBC # FLD: 12.8 %
SODIUM SERPL-SCNC: 139 MMOL/L
SOURCE TP AMPLIFICATION: NORMAL
T4 FREE SERPL-MCNC: 1.3 NG/DL
TRIGL SERPL-MCNC: 72 MG/DL
TSH SERPL-ACNC: 1.31 UIU/ML
WBC # FLD AUTO: 8.15 K/UL

## 2022-12-13 ENCOUNTER — NON-APPOINTMENT (OUTPATIENT)
Age: 42
End: 2022-12-13

## 2022-12-30 DIAGNOSIS — R35.0 FREQUENCY OF MICTURITION: ICD-10-CM

## 2022-12-30 DIAGNOSIS — R39.15 URGENCY OF URINATION: ICD-10-CM

## 2022-12-30 DIAGNOSIS — R30.0 DYSURIA: ICD-10-CM

## 2022-12-30 RX ORDER — NITROFURANTOIN (MONOHYDRATE/MACROCRYSTALS) 25; 75 MG/1; MG/1
100 CAPSULE ORAL
Qty: 14 | Refills: 0 | Status: ACTIVE | COMMUNITY
Start: 2022-12-30 | End: 1900-01-01

## 2023-01-06 ENCOUNTER — TRANSCRIPTION ENCOUNTER (OUTPATIENT)
Age: 43
End: 2023-01-06

## 2023-01-06 LAB — BACTERIA UR CULT: ABNORMAL

## 2023-04-20 DIAGNOSIS — R10.2 PELVIC AND PERINEAL PAIN: ICD-10-CM

## 2023-07-19 ENCOUNTER — NON-APPOINTMENT (OUTPATIENT)
Age: 43
End: 2023-07-19

## 2023-08-18 ENCOUNTER — APPOINTMENT (OUTPATIENT)
Dept: OBGYN | Facility: CLINIC | Age: 43
End: 2023-08-18

## 2023-09-06 ENCOUNTER — APPOINTMENT (OUTPATIENT)
Dept: OBGYN | Facility: CLINIC | Age: 43
End: 2023-09-06
Payer: COMMERCIAL

## 2023-09-06 VITALS
SYSTOLIC BLOOD PRESSURE: 110 MMHG | DIASTOLIC BLOOD PRESSURE: 70 MMHG | BODY MASS INDEX: 24.35 KG/M2 | HEIGHT: 60 IN | WEIGHT: 124 LBS

## 2023-09-06 DIAGNOSIS — Z12.39 ENCOUNTER FOR OTHER SCREENING FOR MALIGNANT NEOPLASM OF BREAST: ICD-10-CM

## 2023-09-06 DIAGNOSIS — Z01.419 ENCOUNTER FOR GYNECOLOGICAL EXAMINATION (GENERAL) (ROUTINE) W/OUT ABNORMAL FINDINGS: ICD-10-CM

## 2023-09-06 DIAGNOSIS — R92.2 INCONCLUSIVE MAMMOGRAM: ICD-10-CM

## 2023-09-06 PROCEDURE — 99396 PREV VISIT EST AGE 40-64: CPT

## 2023-09-06 NOTE — HISTORY OF PRESENT ILLNESS
[TextBox_4] : AMANDA BECK presents for an annual visit.  Matteo is 2- starting preK. Still teaching . Moving toward divorce. Still not sexually active. Di Baumann PFPT helped with dyspareunia. [facility] PT. Insurance won't cover yearly mammogram but will cover ultrasound. Will cover every 2 years.  Wants to check her Vit D and have Rx for Tv u/s to check on fibroid.  Periods are regular Starting horseback riding lessons Some burning during sex Fertility- still unresolved Margarita Jones deonna for exercise Matteo is 3 Cardiac workup- all clear, after covid vax started having symptoms. this  summer will go back for results. Just started spotting after recent period  HISTORY  Allergies: sulfa, ceftanir > hives Medications:  	Rx none 	Vitamins/Supplements; prenatal vits, omega, calcium/vit D, probiotics,   Medical history: 	Musculoskeletal problems--hypermobile, no EDS dx  Surgical history: Date	procedure	anesthesia	complications  bunion surgery both feet  ACL/MCL tear left knee  strabismus surgery right eye  Knee arthroscopy Family history: 	Mother: pancreatic cancer 68yo 	Father: gout 	MGM:  form stroke; also had DM 	MGF:  from heart disease             PGM:  from colon and pancreatic cancer             PGF: unknown              Siblings:              Children:              Aunts/uncles:  OB history: /Para   Date	Type of birth	#weeks    Sex	Name	Weight	   Complications  Breast?  Place  Provider 2019  C/S for CPD at [facility]  40wks  Boy "Hardy" 7-11#  [facility] with Karlie   GYN history: 	[facility] --menarche               LMP: 19 	Abnormal Pap   HPV  NILM 2012 NILM  NILM HPV neg 2016 [facility] HPV neg 2017 [facility] HPV +  > colpo > WNL 2018 [facility] HPV neg > colpo per pt WNL 1/15/2020 NILM w [facility] absent 2021 NILM/ HPV neg              STI: HPV 	Current contraception none--open to pregnancy 	Contraception history 		Type		Dates		Side effects 		OCPs age 15, off and on until 25yo 		 	GYN problems 		Infections 		Ovarian cysts 		Fibroids or polyps: 2 external fibroids 		Endometriosis 		Infertility Sexual history: 	Currently in a sexual relationship with  but has not had sex with him since .  	He has issues with his weight making him feel unattractive so they rarely have sex. 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

## 2023-09-07 DIAGNOSIS — Z12.11 ENCOUNTER FOR SCREENING FOR MALIGNANT NEOPLASM OF COLON: ICD-10-CM

## 2023-09-07 LAB — HPV HIGH+LOW RISK DNA PNL CVX: NOT DETECTED

## 2023-09-11 LAB — CYTOLOGY CVX/VAG DOC THIN PREP: NORMAL

## 2023-10-31 ENCOUNTER — APPOINTMENT (OUTPATIENT)
Dept: HEART AND VASCULAR | Facility: CLINIC | Age: 43
End: 2023-10-31

## 2023-11-03 ENCOUNTER — APPOINTMENT (OUTPATIENT)
Dept: OBGYN | Facility: CLINIC | Age: 43
End: 2023-11-03

## 2023-11-06 ENCOUNTER — APPOINTMENT (OUTPATIENT)
Dept: OBGYN | Facility: CLINIC | Age: 43
End: 2023-11-06
Payer: COMMERCIAL

## 2023-11-06 DIAGNOSIS — N92.6 IRREGULAR MENSTRUATION, UNSPECIFIED: ICD-10-CM

## 2023-11-06 PROCEDURE — 36415 COLL VENOUS BLD VENIPUNCTURE: CPT

## 2023-11-07 LAB — HCG SERPL-MCNC: 8 MIU/ML

## 2023-11-10 ENCOUNTER — APPOINTMENT (OUTPATIENT)
Dept: OBGYN | Facility: CLINIC | Age: 43
End: 2023-11-10
Payer: COMMERCIAL

## 2023-11-10 PROCEDURE — 36415 COLL VENOUS BLD VENIPUNCTURE: CPT

## 2023-11-13 LAB
HCG SERPL-MCNC: 8 MIU/ML
PROGEST SERPL-MCNC: 0.2 NG/ML

## 2023-11-14 ENCOUNTER — APPOINTMENT (OUTPATIENT)
Dept: OBGYN | Facility: CLINIC | Age: 43
End: 2023-11-14
Payer: COMMERCIAL

## 2023-11-14 DIAGNOSIS — R79.89 OTHER SPECIFIED ABNORMAL FINDINGS OF BLOOD CHEMISTRY: ICD-10-CM

## 2023-11-14 PROCEDURE — 36415 COLL VENOUS BLD VENIPUNCTURE: CPT

## 2023-11-15 PROBLEM — R79.89 ELEVATED SERUM HCG: Status: ACTIVE | Noted: 2023-11-11

## 2023-11-15 LAB
HCG SERPL-MCNC: 9 MIU/ML
PROGEST SERPL-MCNC: 0.2 NG/ML

## 2023-11-16 ENCOUNTER — NON-APPOINTMENT (OUTPATIENT)
Age: 43
End: 2023-11-16

## 2024-01-08 ENCOUNTER — APPOINTMENT (OUTPATIENT)
Dept: OBGYN | Facility: CLINIC | Age: 44
End: 2024-01-08
Payer: COMMERCIAL

## 2024-01-08 VITALS
WEIGHT: 139 LBS | BODY MASS INDEX: 27.29 KG/M2 | SYSTOLIC BLOOD PRESSURE: 111 MMHG | HEIGHT: 60 IN | DIASTOLIC BLOOD PRESSURE: 70 MMHG

## 2024-01-08 DIAGNOSIS — N95.1 MENOPAUSAL AND FEMALE CLIMACTERIC STATES: ICD-10-CM

## 2024-01-08 PROCEDURE — 36415 COLL VENOUS BLD VENIPUNCTURE: CPT

## 2024-01-08 PROCEDURE — 99213 OFFICE O/P EST LOW 20 MIN: CPT

## 2024-01-08 RX ORDER — ESTRADIOL 0.04 MG/D
0.04 PATCH, EXTENDED RELEASE TRANSDERMAL
Qty: 8 | Refills: 11 | Status: ACTIVE | COMMUNITY
Start: 2024-01-08 | End: 1900-01-01

## 2024-01-08 RX ORDER — PROGESTERONE 100 MG/1
100 CAPSULE ORAL
Qty: 30 | Refills: 2 | Status: ACTIVE | COMMUNITY
Start: 2024-01-08 | End: 1900-01-01

## 2024-01-08 NOTE — PHYSICAL EXAM
[Normal] : uterus [No Bleeding] : there was no active vaginal bleeding [Uterine Adnexae] : were not tender and not enlarged [FreeTextEntry4] : no bump felt or visualized

## 2024-01-10 ENCOUNTER — TRANSCRIPTION ENCOUNTER (OUTPATIENT)
Age: 44
End: 2024-01-10

## 2024-01-10 DIAGNOSIS — Z29.9 ENCOUNTER FOR PROPHYLACTIC MEASURES, UNSPECIFIED: ICD-10-CM

## 2024-01-11 ENCOUNTER — TRANSCRIPTION ENCOUNTER (OUTPATIENT)
Age: 44
End: 2024-01-11

## 2024-01-11 LAB
ESTRADIOL SERPL-MCNC: 5 PG/ML
FSH SERPL-MCNC: 106 IU/L
LH SERPL-ACNC: 61.4 IU/L

## 2024-01-12 ENCOUNTER — TRANSCRIPTION ENCOUNTER (OUTPATIENT)
Age: 44
End: 2024-01-12

## 2024-02-01 ENCOUNTER — TRANSCRIPTION ENCOUNTER (OUTPATIENT)
Age: 44
End: 2024-02-01

## 2024-03-18 NOTE — DISCUSSION/SUMMARY
[FreeTextEntry1] : Pap/HPV done per pt request annually Mammogram/ breast u/s Rx given Breast self exam reinforced Diet and exercise reviewed Stress mgmt strategies reviewed Supplements: multivitamin, fish oil, Vit D RTO 1yr or prn 
venous doppler negative for DVT 9/2023/peripheral edema

## 2024-07-02 ENCOUNTER — NON-APPOINTMENT (OUTPATIENT)
Age: 44
End: 2024-07-02

## 2024-07-12 ENCOUNTER — NON-APPOINTMENT (OUTPATIENT)
Age: 44
End: 2024-07-12

## 2025-03-10 ENCOUNTER — NON-APPOINTMENT (OUTPATIENT)
Age: 45
End: 2025-03-10